# Patient Record
Sex: MALE | Race: WHITE | Employment: FULL TIME | URBAN - METROPOLITAN AREA
[De-identification: names, ages, dates, MRNs, and addresses within clinical notes are randomized per-mention and may not be internally consistent; named-entity substitution may affect disease eponyms.]

---

## 2022-11-21 ENCOUNTER — HOSPITAL ENCOUNTER (EMERGENCY)
Age: 22
Discharge: STILL ADMITTED OR ADMINISTRATIVE | End: 2022-11-21

## 2022-11-21 ENCOUNTER — HOSPITAL ENCOUNTER (EMERGENCY)
Age: 22
Discharge: HOME | End: 2022-11-21
Attending: STUDENT IN AN ORGANIZED HEALTH CARE EDUCATION/TRAINING PROGRAM
Payer: COMMERCIAL

## 2022-11-21 VITALS
OXYGEN SATURATION: 100 % | WEIGHT: 160 LBS | BODY MASS INDEX: 23.7 KG/M2 | HEIGHT: 69 IN | SYSTOLIC BLOOD PRESSURE: 126 MMHG | RESPIRATION RATE: 16 BRPM | TEMPERATURE: 98.3 F | DIASTOLIC BLOOD PRESSURE: 64 MMHG | HEART RATE: 83 BPM

## 2022-11-21 DIAGNOSIS — L02.11 NECK ABSCESS: Primary | ICD-10-CM

## 2022-11-21 DIAGNOSIS — L73.9 FOLLICULITIS: ICD-10-CM

## 2022-11-21 PROCEDURE — 99283 EMERGENCY DEPT VISIT LOW MDM: CPT

## 2022-11-21 RX ORDER — DOXYCYCLINE HYCLATE 100 MG
100 TABLET ORAL 2 TIMES DAILY
Qty: 14 TABLET | Refills: 0 | Status: SHIPPED | OUTPATIENT
Start: 2022-11-21 | End: 2022-11-28

## 2022-11-21 NOTE — ED TRIAGE NOTES
Patient states Dulce Sanchez has had this nodule for about 2 years and this morning he woke up and had fluid leaking out of it this morning\".

## 2022-11-21 NOTE — ED PROVIDER NOTES
EMERGENCY DEPARTMENT HISTORY AND PHYSICAL EXAM    1:41 PM      Date: 11/21/2022  Patient Name: Sada Orosco    History of Presenting Illness     Chief Complaint   Patient presents with    Abscess     Nodule on his neck         History Provided By: Patient    Additional History (Context): Sada Orosco is a 23 y.o. male with no significant past medical history who presents to the ER with complaints of a bump under the chin present for about 2 years that has been gradually getting bigger over the last couple days soreness. States he woke up this morning and there was pus on his pillow. He states the bump is now smaller and nontender. He denies any fever or chills. No history of similar. PCP: None    Current Outpatient Medications   Medication Sig Dispense Refill    doxycycline (VIBRA-TABS) 100 mg tablet Take 1 Tablet by mouth two (2) times a day for 7 days. 14 Tablet 0       Past History     Past Medical History:  History reviewed. No pertinent past medical history. Past Surgical History:  Past Surgical History:   Procedure Laterality Date    HX APPENDECTOMY      HX ORTHOPAEDIC         Family History:  History reviewed. No pertinent family history. Social History:  Social History     Tobacco Use    Smoking status: Never     Passive exposure: Never    Smokeless tobacco: Never   Vaping Use    Vaping Use: Never used   Substance Use Topics    Alcohol use: No    Drug use: No       Allergies:  No Known Allergies      Review of Systems       Review of Systems   Constitutional: Negative. Negative for chills and fever. HENT: Negative. Negative for congestion, ear pain and rhinorrhea. Eyes: Negative. Negative for pain and redness. Respiratory: Negative. Negative for cough and shortness of breath. Cardiovascular: Negative. Negative for chest pain, palpitations and leg swelling. Gastrointestinal: Negative. Negative for abdominal pain, constipation, diarrhea, nausea and vomiting.    Genitourinary: Negative. Negative for dysuria, frequency, hematuria and urgency. Musculoskeletal: Negative. Negative for back pain, gait problem, joint swelling and neck pain. Skin:  Positive for color change and wound. Negative for rash. Neurological: Negative. Negative for dizziness, seizures, speech difficulty, weakness, light-headedness and headaches. Hematological:  Negative for adenopathy. Does not bruise/bleed easily. All other systems reviewed and are negative. Physical Exam   Visit Vitals  /64 (BP 1 Location: Left upper arm, BP Patient Position: At rest;Sitting)   Pulse 83   Temp 98.3 °F (36.8 °C)   Resp 16   Ht 5' 9\" (1.753 m)   Wt 72.6 kg (160 lb)   SpO2 100%   BMI 23.63 kg/m²         Physical Exam  Vitals and nursing note reviewed. Constitutional:       General: He is not in acute distress. Appearance: Normal appearance. He is normal weight. He is not ill-appearing, toxic-appearing or diaphoretic. HENT:      Head: Normocephalic and atraumatic. Comments: Small pustules along the facial hairline and anterior neck  Eyes:      General: Vision grossly intact. Gaze aligned appropriately. Right eye: No discharge. Left eye: No discharge. Conjunctiva/sclera: Conjunctivae normal.      Pupils: Pupils are equal, round, and reactive to light. Neck:     Cardiovascular:      Rate and Rhythm: Normal rate and regular rhythm. Pulmonary:      Effort: Pulmonary effort is normal. No respiratory distress. Breath sounds: Normal breath sounds. Abdominal:      General: Abdomen is flat. Palpations: Abdomen is soft. Tenderness: There is no abdominal tenderness. Musculoskeletal:         General: Normal range of motion. Cervical back: Full passive range of motion without pain, normal range of motion and neck supple. Lymphadenopathy:      Cervical: No cervical adenopathy. Skin:     General: Skin is warm and dry.       Capillary Refill: Capillary refill takes less than 2 seconds. Findings: Abscess (Small 1 cm area of induration just under the chin. Mobile. Minimally tender. Flesh-colored without erythema. Central area of opening with scabbing. With gentle palpation and expression no pus. No surrounding erythema.) present. Neurological:      General: No focal deficit present. Mental Status: He is alert and oriented to person, place, and time. Diagnostic Study Results     Labs -  No results found for this or any previous visit (from the past 12 hour(s)). Radiologic Studies -   No orders to display         Medical Decision Making   I am the first provider for this patient. I reviewed available nursing notes, past medical history, past surgical history, family history and social history. Vital Signs-Reviewed the patient's vital signs. Records Reviewed: Nursing Notes and Old Medical Records (Time of Review: 1:41 PM)    Pulse Oximetry Analysis - 100% on RA- normal     Cardiac Monitor:  Rate:   Rhythm:    EKG:    ED Course: Progress Notes, Reevaluation, and Consults:  1:41 PM  Initial assessment performed. The patients presenting problems have been discussed, and they/their family are in agreement with the care plan formulated and outlined with them. I have encouraged them to ask questions as they arise throughout their visit. Provider Notes (Medical Decision Making):     24-year-old male presents to the ER with a pustule under his chin that he noticed for quite some time that spontaneously ruptured last night in his sleep. He woke up and there was bloody/purulent drainage on his pillow. On physical exam he does have a small flesh-colored nodule suspect to be a ingrown hair or sebaceous cyst that is appropriately tender and with gentle palpation no fluid expressed. No fluctuance. Abscess not large enough to drain and I do not suspect that there is any pus up-to-date.   I suspect that the abscess drained spontaneously we will start him on some antibiotics. He does have other pustules along his beard line and I suspect he has underlying folliculitis. We will treat with doxycycline. ER return precautions discussed and close follow-up with PCP. Diagnosis     Clinical Impression:   1. Neck abscess    2. Folliculitis        Disposition: Discharged home in stable condition    DISCHARGE NOTE:     Patient has been reexamined. Patient has no new complaints, changes, or physical findings. Care plan outlined and precautions discussed. Results of physical exam findings were reviewed with the patient. All medications were reviewed with the patient; will discharge home with doxycycline. All of patient's questions and concerns were addressed. Patient was instructed and agrees to follow up with PCP, as well as to return to the ED upon further deterioration. Patient is ready to go home. Follow-up Information       Follow up With Specialties Details Why Hieu Pratibha  Schedule an appointment as soon as possible for a visit  Follow-up from the Emergency Department [address] [facility] [id number]  [phone] [address] [facility] EMERGENCY DEPT Emergency Medicine  As needed [address] [facility]  [phone]             Discharge Medication List as of 11/21/2022  2:02 PM            Dictation disclaimer:  Please note that this dictation was completed with [employer], the computer voice recognition software. Quite often unanticipated grammatical, syntax, homophones, and other interpretive errors are inadvertently transcribed by the computer software. Please disregard these errors. Please excuse any errors that have escaped final proofreading.

## 2022-11-21 NOTE — Clinical Note
[address] [facility] EMERGENCY DEPT  [id number] [facility] [address] [id number] [phone]    Work/School Note    Date: 11/21/2022    To Whom It May concern:    Vahe Dykes was seen and treated today in the emergency room by the following provider(s):  Attending Provider: Emily Patel MD  Nurse Practitioner: NATHAN Carroll. Chance LUIS ENRIQUE Dykes is excused from work/school on 11/21/22 and 11/22/22. He is medically clear to return to work/school on 11/23/2022.        Sincerely,          NATHAN Garcia